# Patient Record
(demographics unavailable — no encounter records)

---

## 2025-07-15 NOTE — PHYSICAL EXAM
[Chaperoned Physical Exam] : A chaperone was present in the examining room during all aspects of the physical examination. [MA] : MA [FreeTextEntry2] : sachin [Appropriately responsive] : appropriately responsive [Alert] : alert [No Acute Distress] : no acute distress [No Lymphadenopathy] : no lymphadenopathy [Regular Rate Rhythm] : regular rate rhythm [No Murmurs] : no murmurs [Clear to Auscultation B/L] : clear to auscultation bilaterally [Soft] : soft [Non-tender] : non-tender [Non-distended] : non-distended [No HSM] : No HSM [No Lesions] : no lesions [No Mass] : no mass [Oriented x3] : oriented x3 [Examination Of The Breasts] : a normal appearance [No Masses] : no breast masses were palpable [Vulvar Atrophy] : vulvar atrophy [Labia Majora] : normal [Labia Minora] : normal [Atrophy] : atrophy [Normal] : normal [Enlarged ___ wks] : enlarged [unfilled] ~Uweeks [Uterine Adnexae] : normal [Declined] : Patient declined rectal exam

## 2025-07-15 NOTE — HISTORY OF PRESENT ILLNESS
[N] : Patient does not use contraception [Y] : Positive pregnancy history [Menarche Age: ____] : age at menarche was [unfilled] [FreeTextEntry1] : 46-year-old -0-2-2 presents for GYN evaluation.  Denies pain bleeding or discharge.  History of fibroid uterus. [PGHxTotal] : 4 [[facility] : 2 [PGHxPremature] : 0 [PGHxAbortions] : 2 [[facility]] : 2 [PGHxABInduced] : 1 [PGHxABSpont] : 1 [PGHxEctopic] : 0 [PGHxMultBirths] : 0

## 2025-07-15 NOTE — DISCUSSION/SUMMARY
[FreeTextEntry1] : 46-year-old -0-2-2 presently being treated for diabetes mellitus.  History of fibroid uterus no pain or bleeding.  Physical examination confirms atrophic changes and fibroid uterus.  Pap GC chlamydia sent and mammogram ordered.  Diet and exercise stressed.  Return in 1 year for follow-up.